# Patient Record
Sex: MALE | Race: OTHER | HISPANIC OR LATINO | URBAN - METROPOLITAN AREA
[De-identification: names, ages, dates, MRNs, and addresses within clinical notes are randomized per-mention and may not be internally consistent; named-entity substitution may affect disease eponyms.]

---

## 2021-12-26 ENCOUNTER — EMERGENCY (EMERGENCY)
Facility: HOSPITAL | Age: 41
LOS: 1 days | Discharge: HOME | End: 2021-12-26
Attending: STUDENT IN AN ORGANIZED HEALTH CARE EDUCATION/TRAINING PROGRAM
Payer: COMMERCIAL

## 2021-12-26 VITALS
SYSTOLIC BLOOD PRESSURE: 135 MMHG | DIASTOLIC BLOOD PRESSURE: 85 MMHG | HEART RATE: 98 BPM | OXYGEN SATURATION: 96 % | RESPIRATION RATE: 18 BRPM

## 2021-12-26 VITALS
DIASTOLIC BLOOD PRESSURE: 64 MMHG | RESPIRATION RATE: 20 BRPM | WEIGHT: 179.9 LBS | HEIGHT: 72 IN | HEART RATE: 112 BPM | SYSTOLIC BLOOD PRESSURE: 156 MMHG | TEMPERATURE: 98 F | OXYGEN SATURATION: 97 %

## 2021-12-26 LAB
FLUAV AG NPH QL: SIGNIFICANT CHANGE UP
FLUBV AG NPH QL: SIGNIFICANT CHANGE UP
RSV RNA NPH QL NAA+NON-PROBE: SIGNIFICANT CHANGE UP
SARS-COV-2 RNA SPEC QL NAA+PROBE: DETECTED

## 2021-12-26 PROCEDURE — 99283 EMERGENCY DEPT VISIT LOW MDM: CPT

## 2021-12-26 PROCEDURE — 87637 SARSCOV2&INF A&B&RSV AMP PRB: CPT

## 2021-12-26 PROCEDURE — 99284 EMERGENCY DEPT VISIT MOD MDM: CPT

## 2021-12-26 RX ORDER — ACETAMINOPHEN 500 MG
975 TABLET ORAL ONCE
Refills: 0 | Status: COMPLETED | OUTPATIENT
Start: 2021-12-26 | End: 2021-12-26

## 2021-12-26 RX ORDER — ONDANSETRON 8 MG/1
4 TABLET, FILM COATED ORAL ONCE
Refills: 0 | Status: COMPLETED | OUTPATIENT
Start: 2021-12-26 | End: 2021-12-26

## 2021-12-26 RX ADMIN — Medication 975 MILLIGRAM(S): at 20:26

## 2021-12-26 RX ADMIN — ONDANSETRON 4 MILLIGRAM(S): 8 TABLET, FILM COATED ORAL at 20:25

## 2021-12-26 NOTE — ED PROVIDER NOTE - PATIENT PORTAL LINK FT
You can access the FollowMyHealth Patient Portal offered by [facility] by registering at the following website: [email or web address]. By joining [employer]’s FollowMyHealth portal, you will also be able to view your health information using other applications (apps) compatible with our system.

## 2021-12-26 NOTE — ED PROVIDER NOTE - CLINICAL SUMMARY MEDICAL DECISION MAKING FREE TEXT BOX
41y male no PMHx p/w SOB. pt reports 2 days of fevers tmax 102F this am, dry cough, myalgias and SOB with exertion. pt also notes 2 episodes of NBNB emesis today. pt was sent from urgent care where his spo2 dropped to low 90s with ambulation. pt well appearing in ED nonfocal exam. tachycardic 110s, SPO2 >95% resting and ambulatory. no resp distress. ddx viral URI (likely covid). meds, swab, reassess - Sam Sanchez PA-C

## 2021-12-26 NOTE — ED PROVIDER NOTE - PROGRESS NOTE DETAILS
pt reports symptoms improved. tolerating PO. vitals stable. Will dc with follow up. Discussed plan and return precautions with patient who understands and agrees. All questions answered. - Sam Sanchez PA-C

## 2021-12-26 NOTE — ED PROVIDER NOTE - OBJECTIVE STATEMENT
41y male no PMHx p/w SOB. pt reports 2 days of fevers tmax 102F this am, dry cough, myalgias and SOB with exertion. pt also notes 2 episodes of NBNB emesis today. pt was sent from urgent care where his spo2 dropped to low 90s with ambulation. negative rapid covid at urgent care today. vaccinated x1 with [employer]. no known direct sick contacts. Denies CP, dizziness, palpitations, LOC, abd pain, or urinary symptoms  SPO2 in ED >95% resting and with ambulation, no visible respiratory distress

## 2021-12-26 NOTE — ED ADULT NURSE NOTE - OBJECTIVE STATEMENT
patient is a 42 y/o M with no pertinent PMH who presents to the ED c/o SOB, BURGOS, fevers (T max 102), and body aches x 2 days. patient seen at  today and had negative rapid test however is waiting for PCR results. while at  patient states that his SPO2 saturation was low 90s and was sent to ED for further eval. in [facility] ED patient SPO2 >96% at this time both resting and ambulatory. patient also endorsing 2 episodes of NBNB emesis this morning with decreased po intake. patient resting comfortably in bed in NAD. respirations even and unlabored with symmetrical chest rise. lungs clear. MD Pettet at bedside to assess.

## 2021-12-26 NOTE — ED PROVIDER NOTE - ATTENDING CONTRIBUTION TO CARE
I, Paolo Shane, performed a history and physical exam of the patient and discussed their management with the resident and/or advanced care provider. I reviewed the resident and/or advanced care provider's note and agree with the documented findings and plan of care. I was present and available for all procedures.    ching attending- lionel concern for viral illness, less likely bacterial PNA or severe COVID-19 without severe sx of shortness of breath, abnormal vital signs, well appearing, exam only with mild viral sx at this time. well appearing and exam without significant findings. educated patient about signs and symptoms to look out for. patient feels comfortable with plan and will self quarantine from start of sx. given the time to ask questions and all questions were answered. advised to return to the emergency room for any concerns or worsening signs / symptoms. will continue symptomatic relief and advised for pmd f/u

## 2022-02-14 NOTE — ED PROVIDER NOTE - NSFOLLOWUPINSTRUCTIONS_ED_ALL_ED_FT
----- Message from Litzy Gutierres MD sent at 2/14/2022  8:04 AM CST -----  The echo showed normal heart function.  
YOU HAVE BEEN TESTED FOR COVID-19. YOU MUST REMAIN ISOLATED AT HOME FOR 10 DAYS MINIMUM UNTIL TOLD OTHERWISE TO PREVENT FURTHER SPREAD OF THIS DISEASE. ANYONE WHO LIVES WITH YOU MUST ALSO SELF ISOLATE.   PLEASE READ ATTACHED MATERIAL. AVOID ELDERLY AND IMMUNOCOMPROMISED POPULATION. WASH YOUR HANDS, AVOID TOUCHING YOUR FACE, AND SNEEZE/COUGH INTO THE CROOK OF YOUR ARM.     If you wish to be tested for COVID19 you may call the [facility] COVID19 Hotline: 1-113.179.1325    For fever/body aches:  Take Tylenol 650mg every 6 hrs as needed for pain.  Take motrin 400-600mg every 6-8hours as needed. TAKE WITH FOOD.    Stay well hydrated with water and electrolyte replacement solutions such as Pedialyte or the adult equivalent.     Return to the ED for worsening symptoms, shortness of breath, chest pain, inability to tolerate food/drink, loss of consciousness, or any other serious concerns.    -------------    What is a coronavirus?  Coronaviruses are a large family of viruses that cause illnesses ranging from the common cold to more severe diseases such as Middle East Respiratory Syndrome (MERS) and Severe Acute Respiratory Syndrome (SARS).    What is Novel Coronavirus (COVID-19)?  The Centers for Disease Control and Prevention (CDC) is closely monitoring the outbreak caused by COVID-19. For the latest information about COVID-19, visit the CDC website at CDC.gov/Coronavirus    How are coronaviruses spread?  Coronaviruses can be transmitted from person to person, usually after close contact with an infected  person (for example, in a household, workplace, or healthcare setting), via droplets that become airborne after a cough or sneeze. These droplets can then infect a nearby person. Transmission can also occur by touching recently contaminated surfaces.    Is there a treatment for a COVID-19?  There is no specific treatment for disease caused by COVID-19. However, many of the symptoms can be treated based on the patient’s clinical condition. Supportive care for infected persons can be highly effective.    What are the symptoms of coronavirus infection?  It depends on the virus, but common signs include fever and/or respiratory symptoms such as cough and shortness of breath. In more severe cases, infection can cause pneumonia, severe acute respiratory syndrome, kidney failure and even death. Fortunately, most cases of COVID-19 have an illness no different than the influenza (flu), with a majority of these patients having mild symptoms and overall mortality which appears to be not much different than the flu.    What can I do to protect myself?  The best precautionary measures:  – washing your hands  – covering your cough  – disinfecting surfaces  – it is also advisable to avoid close contact with anyone showing symptoms of respiratory illness such as coughing and sneezing  – those with symptoms should wear a surgical mask when around others    What can I do to protect those around me?  If you have been identified as someone who may be infected with COVID-19, we recommend you follow the self-isolation procedures outlined on the following page to protect those around you and to limit the spread of this virus.    We recommend the below precautionary steps from now until 14 days from when you returned from your travel or date of your last known possible contact:    — Do not go to work, school or public areas. Avoid using public transportation, ridesharing or taxis.  — As much as possible, separate yourself from other people in your home. If you can, you should stay in a room and away from other people. Also, you should use a separate bathroom if available.  — Wear the supplied mask whenever you are around other people.  — If you have a non-urgent medical appointment, please reschedule for a later date. If the appointment is urgent, please call the health care provider and tell them that you are on self-isolation for possible COVID-19. This will help the health care provider’s office take steps to keep other people from getting infected or exposed. If you can reschedule routine appointments, do so.  — Wash your hands often with soap and water for at least 15 to 20 seconds or clean your hands with an alcohol-based hand  that contains 60 to 95% alcohol, covering all surfaces of your hands and rubbing them together until they feel dry. Soap and water should be used preferentially if hands are visibly dirty.  — Cover your mouth and nose with a tissue when you cough or sneeze. Throw used tissues in a lined trash can. Immediately wash your hands.  — Avoid touching your eyes, nose, and mouth with your hands.  — Avoid sharing personal household items. You should not share dishes, drinking glasses, cups, eating utensils, towels, or bedding with other people or pets in your home. After using these items, they should be washed thoroughly with soap and water.  — Clean and disinfect all “high-touch” surfaces every day. High touch surfaces include counters, tabletops, doorknobs, light switches, remote controls, bathroom fixtures, toilets, phones, keyboards, tablets, and bedside tables. Also, clean any surfaces that may have blood, stool, or body fluids on them.

## 2024-11-05 NOTE — ED PROVIDER NOTE - NSTIMEPROVIDERCAREINITIATE_GEN_ER
Called to ask if patient would like to move his appointment from 11/19/24 to 11/12/2024.    HUB ok to reschedule  
26-Dec-2021 20:23